# Patient Record
Sex: FEMALE | Race: WHITE | NOT HISPANIC OR LATINO | Employment: PART TIME | URBAN - METROPOLITAN AREA
[De-identification: names, ages, dates, MRNs, and addresses within clinical notes are randomized per-mention and may not be internally consistent; named-entity substitution may affect disease eponyms.]

---

## 2021-08-20 ENCOUNTER — LAB REQUISITION (OUTPATIENT)
Dept: LAB | Facility: CLINIC | Age: 31
End: 2021-08-20

## 2021-08-20 PROCEDURE — 86706 HEP B SURFACE ANTIBODY: CPT | Performed by: INTERNAL MEDICINE

## 2021-08-20 PROCEDURE — 86481 TB AG RESPONSE T-CELL SUSP: CPT | Performed by: INTERNAL MEDICINE

## 2021-08-22 LAB
QUANTIFERON MITOGEN: 10 IU/ML
QUANTIFERON NIL TUBE: 0.01 IU/ML
QUANTIFERON TB1 TUBE: 0.03 IU/ML
QUANTIFERON TB2 TUBE: 0.02

## 2021-08-23 LAB
GAMMA INTERFERON BACKGROUND BLD IA-ACNC: 0.01 IU/ML
HBV SURFACE AB SERPL IA-ACNC: 0.98 M[IU]/ML
M TB IFN-G BLD-IMP: NEGATIVE
M TB IFN-G CD4+ BCKGRND COR BLD-ACNC: 9.99 IU/ML
MITOGEN IGNF BCKGRD COR BLD-ACNC: 0.01 IU/ML
MITOGEN IGNF BCKGRD COR BLD-ACNC: 0.02 IU/ML

## 2021-11-27 ENCOUNTER — APPOINTMENT (OUTPATIENT)
Dept: ULTRASOUND IMAGING | Facility: CLINIC | Age: 31
End: 2021-11-27
Attending: EMERGENCY MEDICINE
Payer: OTHER MISCELLANEOUS

## 2021-11-27 ENCOUNTER — HOSPITAL ENCOUNTER (EMERGENCY)
Facility: CLINIC | Age: 31
Discharge: HOME | End: 2021-11-27
Attending: EMERGENCY MEDICINE | Admitting: EMERGENCY MEDICINE
Payer: OTHER MISCELLANEOUS

## 2021-11-27 VITALS
OXYGEN SATURATION: 97 % | RESPIRATION RATE: 16 BRPM | SYSTOLIC BLOOD PRESSURE: 112 MMHG | TEMPERATURE: 97.8 F | DIASTOLIC BLOOD PRESSURE: 70 MMHG | HEART RATE: 104 BPM

## 2021-11-27 DIAGNOSIS — M54.41 ACUTE RIGHT-SIDED LOW BACK PAIN WITH RIGHT-SIDED SCIATICA: ICD-10-CM

## 2021-11-27 LAB
ALBUMIN SERPL-MCNC: 3.3 G/DL (ref 3.4–5)
ALP SERPL-CCNC: 53 U/L (ref 40–150)
ALT SERPL W P-5'-P-CCNC: 16 U/L (ref 0–50)
ANION GAP SERPL CALCULATED.3IONS-SCNC: 6 MMOL/L (ref 3–14)
AST SERPL W P-5'-P-CCNC: 9 U/L (ref 0–45)
BASOPHILS # BLD AUTO: 0 10E3/UL (ref 0–0.2)
BASOPHILS NFR BLD AUTO: 0 %
BILIRUB SERPL-MCNC: 0.5 MG/DL (ref 0.2–1.3)
BUN SERPL-MCNC: 5 MG/DL (ref 7–30)
CALCIUM SERPL-MCNC: 8.9 MG/DL (ref 8.5–10.1)
CHLORIDE BLD-SCNC: 107 MMOL/L (ref 94–109)
CO2 SERPL-SCNC: 23 MMOL/L (ref 20–32)
CREAT SERPL-MCNC: 0.66 MG/DL (ref 0.52–1.04)
EOSINOPHIL # BLD AUTO: 0 10E3/UL (ref 0–0.7)
EOSINOPHIL NFR BLD AUTO: 1 %
ERYTHROCYTE [DISTWIDTH] IN BLOOD BY AUTOMATED COUNT: 13.8 % (ref 10–15)
GFR SERPL CREATININE-BSD FRML MDRD: >90 ML/MIN/1.73M2
GLUCOSE BLD-MCNC: 75 MG/DL (ref 70–99)
HCT VFR BLD AUTO: 36.6 % (ref 35–47)
HGB BLD-MCNC: 12.3 G/DL (ref 11.7–15.7)
HOLD SPECIMEN: NORMAL
HOLD SPECIMEN: NORMAL
IMM GRANULOCYTES # BLD: 0.1 10E3/UL
IMM GRANULOCYTES NFR BLD: 1 %
LYMPHOCYTES # BLD AUTO: 1.6 10E3/UL (ref 0.8–5.3)
LYMPHOCYTES NFR BLD AUTO: 24 %
MCH RBC QN AUTO: 29.1 PG (ref 26.5–33)
MCHC RBC AUTO-ENTMCNC: 33.6 G/DL (ref 31.5–36.5)
MCV RBC AUTO: 87 FL (ref 78–100)
MONOCYTES # BLD AUTO: 0.5 10E3/UL (ref 0–1.3)
MONOCYTES NFR BLD AUTO: 7 %
NEUTROPHILS # BLD AUTO: 4.5 10E3/UL (ref 1.6–8.3)
NEUTROPHILS NFR BLD AUTO: 67 %
NRBC # BLD AUTO: 0 10E3/UL
NRBC BLD AUTO-RTO: 0 /100
PLATELET # BLD AUTO: 150 10E3/UL (ref 150–450)
POTASSIUM BLD-SCNC: 3.5 MMOL/L (ref 3.4–5.3)
PROT SERPL-MCNC: 6.8 G/DL (ref 6.8–8.8)
RBC # BLD AUTO: 4.23 10E6/UL (ref 3.8–5.2)
SODIUM SERPL-SCNC: 136 MMOL/L (ref 133–144)
WBC # BLD AUTO: 6.6 10E3/UL (ref 4–11)

## 2021-11-27 PROCEDURE — 250N000013 HC RX MED GY IP 250 OP 250 PS 637: Performed by: EMERGENCY MEDICINE

## 2021-11-27 PROCEDURE — 99284 EMERGENCY DEPT VISIT MOD MDM: CPT | Mod: 25 | Performed by: EMERGENCY MEDICINE

## 2021-11-27 PROCEDURE — 76805 OB US >/= 14 WKS SNGL FETUS: CPT | Mod: 26 | Performed by: RADIOLOGY

## 2021-11-27 PROCEDURE — 85025 COMPLETE CBC W/AUTO DIFF WBC: CPT | Performed by: EMERGENCY MEDICINE

## 2021-11-27 PROCEDURE — 99284 EMERGENCY DEPT VISIT MOD MDM: CPT | Performed by: EMERGENCY MEDICINE

## 2021-11-27 PROCEDURE — 36415 COLL VENOUS BLD VENIPUNCTURE: CPT | Performed by: EMERGENCY MEDICINE

## 2021-11-27 PROCEDURE — 76817 TRANSVAGINAL US OBSTETRIC: CPT | Mod: 26 | Performed by: RADIOLOGY

## 2021-11-27 PROCEDURE — 76805 OB US >/= 14 WKS SNGL FETUS: CPT

## 2021-11-27 PROCEDURE — 250N000011 HC RX IP 250 OP 636: Performed by: EMERGENCY MEDICINE

## 2021-11-27 PROCEDURE — 80053 COMPREHEN METABOLIC PANEL: CPT | Performed by: EMERGENCY MEDICINE

## 2021-11-27 RX ORDER — ONDANSETRON 4 MG/1
4 TABLET, ORALLY DISINTEGRATING ORAL ONCE
Status: COMPLETED | OUTPATIENT
Start: 2021-11-27 | End: 2021-11-27

## 2021-11-27 RX ORDER — ACETAMINOPHEN 500 MG
1000 TABLET ORAL ONCE
Status: COMPLETED | OUTPATIENT
Start: 2021-11-27 | End: 2021-11-27

## 2021-11-27 RX ORDER — CYCLOBENZAPRINE HCL 5 MG
10 TABLET ORAL ONCE
Status: COMPLETED | OUTPATIENT
Start: 2021-11-27 | End: 2021-11-27

## 2021-11-27 RX ORDER — HYDROCODONE BITARTRATE AND ACETAMINOPHEN 5; 325 MG/1; MG/1
1-2 TABLET ORAL EVERY 6 HOURS PRN
Qty: 18 TABLET | Refills: 0 | Status: SHIPPED | OUTPATIENT
Start: 2021-11-27 | End: 2021-11-30

## 2021-11-27 RX ADMIN — ONDANSETRON 4 MG: 4 TABLET, ORALLY DISINTEGRATING ORAL at 16:37

## 2021-11-27 RX ADMIN — ACETAMINOPHEN 1000 MG: 500 TABLET, FILM COATED ORAL at 13:46

## 2021-11-27 RX ADMIN — CYCLOBENZAPRINE HYDROCHLORIDE 10 MG: 5 TABLET, FILM COATED ORAL at 13:47

## 2021-11-27 ASSESSMENT — ENCOUNTER SYMPTOMS
HEMATURIA: 0
BACK PAIN: 1
WEAKNESS: 0
BLOOD IN STOOL: 0
DYSURIA: 0

## 2021-11-27 NOTE — ED TRIAGE NOTES
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 She reported pain at the time and it has gotten worse over night.   PT is  3 para 1 20 weeks gestation currently.     Home Treatments/Remedies: None    Febrile / Afebrile? Afebrile     Duration of C/o:  12hrs     Osvaldo Salgado RN  2021

## 2021-11-27 NOTE — Clinical Note
Umm Colvin was seen and treated in our emergency department on 11/27/2021.  She may return to work on 12/04/2021.       If you have any questions or concerns, please don't hesitate to call.      Bud Chilel MD

## 2021-11-27 NOTE — ED PROVIDER NOTES
"    [place] EMERGENCY DEPARTMENT ([facility])  21 ED16  History     Chief Complaint   Patient presents with     Back Pain     The history is provided by the patient and medical records.     Umm Colvin is a 31 year old female with a past medical history of currently is 20 weeks pregnant (, LMP 21), left cervical radial radiculopathy, lumbar degenerative disc disease who presents to the emergency department for evaluation of lower back pain.     Patient is currently 20 weeks pregnant (, LMP 21).  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  She states that she went to push the board with her knee while holding a sheet.  She says while doing this she felt a \"twinge\" in her lower back and sacral area.  She adds that after correcting herself from this position she did not \"feel much\".  She notes that while driving home about 45 minutes later she began to feel soreness in her lower back.  She says that she woke up this morning and felt that her gait was a bit off.  She adds that she continue to work throughout the day and noticed that her pain was progressing.  She describes the back pain is primarily located in her lower back which radiates into the right buttocks into the right calf along with pain in the SI and pelvis area. She notes that she has history of back problems due to a fall in  and a fall in .  She adds that after her fall up the stairs in  she has been experiencing SI issues since.  Patient denies taking any pain relief medications prior to ED arrival.  Patient denies dysuria, hematuria, vaginal bleeding, leg weakness, black or bloody stools.  Patient notes she does not always feel the baby move due to having the placenta located in the front.    Per chart review, patient was seen at [facility] on 2019 for evaluation of back pain.  During this visitation patient was " diagnosed with left cervical radiculopathy and lumbar degenerative disc disease.  Patient had multiple imaging studies performed which are described below.    Technique: XR THORACIC SPINE AP LATERAL SWIMMER   Impression: Unremarkable thoracic spine examination.     Technique: XR CERVICAL SPINE AP LATERAL FLEXION EXTENSION  Impression: Mild degeneration of C6-7 disc.      Technique: Multiplanar multisequence thoracic spine MRI without   contrast.   Impression: Small, stable T11-12 disc protrusion.      Technique: Multiplanar multisequence cervical spine MRI without   contrast.   Impression:   1. Small left paracentral/proximal foraminal disc protrusion at C5-C6   results in mild proximal left foraminal stenosis.    Technique: Multiplanar multisequence lumbar spine MRI without   contrast.   Impression:   1. Development of a small, broad-base central L4-5 disc protrusion   without neural impingement.  2. Slight decrease in size of L3-4 disc protrusion.  3. Stable left paracentral T11-12 disc protrusion.      No past medical history on file.    No past surgical history on file.    No family history on file.    Social History     Tobacco Use     Smoking status: Not on file     Smokeless tobacco: Not on file   Substance Use Topics     Alcohol use: Not on file       Current Facility-Administered Medications   Medication     0.9% sodium chloride BOLUS     Current Outpatient Medications   Medication     HYDROcodone-acetaminophen (NORCO) 5-325 MG tablet      No Known Allergies      Review of Systems   Gastrointestinal: Negative for blood in stool.   Genitourinary: Negative for dysuria, hematuria and vaginal bleeding.   Musculoskeletal: Positive for back pain (lower radiates into right leg).   Neurological: Negative for weakness.   All other systems reviewed and are negative.    A complete review of systems was performed with pertinent positives and negatives noted in the HPI, and all other systems negative.    Physical Exam    BP: (!) 130/92  Pulse: 104  Temp: 97.8  F (36.6  C)  Resp: 16  SpO2: 97 %  Physical Exam  Constitutional:       General: She is not in acute distress.     Appearance: She is well-developed.   HENT:      Head: Normocephalic and atraumatic.   Eyes:      Conjunctiva/sclera: Conjunctivae normal.      Pupils: Pupils are equal, round, and reactive to light.   Neck:      Thyroid: No thyromegaly.      Trachea: No tracheal deviation.   Cardiovascular:      Rate and Rhythm: Normal rate and regular rhythm.      Heart sounds: Normal heart sounds. No murmur heard.      Pulmonary:      Effort: Pulmonary effort is normal. No respiratory distress.      Breath sounds: Normal breath sounds. No wheezing.   Chest:      Chest wall: No tenderness.   Abdominal:      General: There is no distension.      Palpations: Abdomen is soft.      Tenderness: There is abdominal tenderness.   Musculoskeletal:         General: No tenderness.      Cervical back: Normal range of motion and neck supple.        Back:    Skin:     General: Skin is warm.      Findings: No rash.   Neurological:      Mental Status: She is alert and oriented to person, place, and time.      Sensory: No sensory deficit.   Psychiatric:         Behavior: Behavior normal.         ED Course       1:29 PM  The patient was seen and examined by Bud Chilel MD in Room 16.    Procedures            Results for orders placed or performed during the hospital encounter of 11/27/21   US OB > 14 Weeks Complete w Transvaginal     Status: None    Narrative    US OB >14 WEEKS TRANSVAGINAL 11/27/2021 2:13 PM    HISTORY: 20 weeks pregnant, abdominal pain    COMPARISON: None    FINDINGS:  Limited transabdominal scan of the pelvis.    There is a single living intrauterine fetus.     Biometry:  BPD: 48 mm corresponding to 20 weeks, 3 days  HC: 169 mm corresponding to 19 weeks, 4 days  AC: 157 mm corresponding to 20 weeks, 3 days  FL: 31 mm corresponding to 19 weeks 6 days    Fetal Heart Rate:  140 beats per minute.  Fetal Presentation: Cephalic  Placental location: Anterior, no evidence of placenta previa  Amniotic fluid volume: Normal, NAV was not calculated.  Estimated gestational age: 20 weeks 2 days  Estimated fetal weight: 342 g        Impression    IMPRESSION:   Single living pregnancy. No abnormality is identified on this limited  scan.    I have personally reviewed the examination and initial interpretation  and I agree with the findings.    DENNISE WILSON MD         SYSTEM ID:  [id number]   Comprehensive metabolic panel     Status: Abnormal   Result Value Ref Range    Sodium 136 133 - 144 mmol/L    Potassium 3.5 3.4 - 5.3 mmol/L    Chloride 107 94 - 109 mmol/L    Carbon Dioxide (CO2) 23 20 - 32 mmol/L    Anion Gap 6 3 - 14 mmol/L    Urea Nitrogen 5 (L) 7 - 30 mg/dL    Creatinine 0.66 0.52 - 1.04 mg/dL    Calcium 8.9 8.5 - 10.1 mg/dL    Glucose 75 70 - 99 mg/dL    Alkaline Phosphatase 53 40 - 150 U/L    AST 9 0 - 45 U/L    ALT 16 0 - 50 U/L    Protein Total 6.8 6.8 - 8.8 g/dL    Albumin 3.3 (L) 3.4 - 5.0 g/dL    Bilirubin Total 0.5 0.2 - 1.3 mg/dL    GFR Estimate >90 >60 mL/min/1.73m2   CBC with platelets and differential     Status: Abnormal   Result Value Ref Range    WBC Count 6.6 4.0 - 11.0 10e3/uL    RBC Count 4.23 3.80 - 5.20 10e6/uL    Hemoglobin 12.3 11.7 - 15.7 g/dL    Hematocrit 36.6 35.0 - 47.0 %    MCV 87 78 - 100 fL    MCH 29.1 26.5 - 33.0 pg    MCHC 33.6 31.5 - 36.5 g/dL    RDW 13.8 10.0 - 15.0 %    Platelet Count 150 150 - 450 10e3/uL    % Neutrophils 67 %    % Lymphocytes 24 %    % Monocytes 7 %    % Eosinophils 1 %    % Basophils 0 %    % Immature Granulocytes 1 %    NRBCs per 100 WBC 0 <1 /100    Absolute Neutrophils 4.5 1.6 - 8.3 10e3/uL    Absolute Lymphocytes 1.6 0.8 - 5.3 10e3/uL    Absolute Monocytes 0.5 0.0 - 1.3 10e3/uL    Absolute Eosinophils 0.0 0.0 - 0.7 10e3/uL    Absolute Basophils 0.0 0.0 - 0.2 10e3/uL    Absolute Immature Granulocytes 0.1 (H) <=0.0 10e3/uL     Absolute NRBCs 0.0 10e3/uL     Medications   0.9% sodium chloride BOLUS (500 mLs Intravenous Not Given 11/27/21 1635)   acetaminophen (TYLENOL) tablet 1,000 mg (1,000 mg Oral Given 11/27/21 1346)   cyclobenzaprine (FLEXERIL) tablet 10 mg (10 mg Oral Given 11/27/21 1347)   ondansetron (ZOFRAN-ODT) ODT tab 4 mg (4 mg Oral Given 11/27/21 1637)        Assessments & Plan (with Medical Decision Making)   Patient is a 31-year-old female who is 20 weeks pregnant who presents with right lower back pain with some radiation down the right leg as well has into her right low abdominal area.  She has no vaginal bleeding.  She has no urine complaints.  She does have a history of degenerative disc disease of her lumbar spine with some L3-4 and L4-5 disc protrusion.    On exam, patient blood pressure is 130/92 pulse rate of 1004 and temperature of 97.8.  Respirations were 16 with O2 saturations of 97%.  She has tenderness in her right mid and lower lumbar area.  She has slight groin tenderness on the right side.    Patient is neurologically intact.    Due to patient's pregnancy, there are less options for medication management.  Patient be given Tylenol, Zofran, and Flexeril, all of which should be safe in pregnancy.    A pelvic ultrasound will be obtained.    Patient felt sedated and off mentally.  She was observed in the emergency department for an extended period of time.  She gradually improved.  This is most likely related to the Flexeril, and we will not use this medication anymore.      Pelvic ultrasound showed no acute abnormalities.    Her comprehensive metabolic profile was unremarkable.  Patient's white count was 6.6 with a hemoglobin of 12.3.    Patient's blood pressure improved 112/70.    The most likely cause of her pain is a lumbar radiculopathy.  There appear to be no complications of pregnancy.  She will be medically managed with Vicodin as needed for more severe pain and then with local measures for pain  management.    I have reviewed the nursing notes. I have reviewed the findings, diagnosis, plan and need for follow up with the patient.    Discharge Medication List as of 11/27/2021  7:04 PM      START taking these medications    Details   HYDROcodone-acetaminophen (NORCO) 5-325 MG tablet Take 1-2 tablets by mouth every 6 hours as needed for pain, Disp-18 tablet, R-0, Local Print             Final diagnoses:   Acute right-sided low back pain with right-sided sciatica     I, Maria Del Carmen Webster, am serving as a trained medical scribe to document services personally performed by Bud Chilel MD, based on the provider's statements to me.      I, Bud Chilel MD, was physically present and have reviewed and verified the accuracy of this note documented by Maria Del Carmen Webster.      --  Bud Chilel MD  [facility] EMERGENCY DEPARTMENT  11/27/2021     Bud Chilel MD  11/28/21 [id number]

## 2021-11-28 NOTE — DISCHARGE INSTRUCTIONS
Warm or cold packs to the back as needed  Take Tylenol or take the stronger medication that I am prescribing based on the level of your pain